# Patient Record
Sex: MALE | Race: WHITE | NOT HISPANIC OR LATINO | Employment: FULL TIME | URBAN - NONMETROPOLITAN AREA
[De-identification: names, ages, dates, MRNs, and addresses within clinical notes are randomized per-mention and may not be internally consistent; named-entity substitution may affect disease eponyms.]

---

## 2021-07-18 ENCOUNTER — OFFICE VISIT (OUTPATIENT)
Dept: URGENT CARE | Facility: PHYSICIAN GROUP | Age: 36
End: 2021-07-18
Payer: COMMERCIAL

## 2021-07-18 ENCOUNTER — HOSPITAL ENCOUNTER (OUTPATIENT)
Facility: MEDICAL CENTER | Age: 36
End: 2021-07-18
Attending: PHYSICIAN ASSISTANT
Payer: COMMERCIAL

## 2021-07-18 ENCOUNTER — APPOINTMENT (OUTPATIENT)
Dept: RADIOLOGY | Facility: IMAGING CENTER | Age: 36
End: 2021-07-18
Attending: PHYSICIAN ASSISTANT
Payer: COMMERCIAL

## 2021-07-18 VITALS
TEMPERATURE: 97.5 F | SYSTOLIC BLOOD PRESSURE: 106 MMHG | DIASTOLIC BLOOD PRESSURE: 74 MMHG | WEIGHT: 226 LBS | BODY MASS INDEX: 30.61 KG/M2 | RESPIRATION RATE: 12 BRPM | HEART RATE: 104 BPM | OXYGEN SATURATION: 93 % | HEIGHT: 72 IN

## 2021-07-18 DIAGNOSIS — R79.81 LOW OXYGEN SATURATION: ICD-10-CM

## 2021-07-18 DIAGNOSIS — R00.0 TACHYCARDIA: ICD-10-CM

## 2021-07-18 DIAGNOSIS — R06.02 SOB (SHORTNESS OF BREATH): ICD-10-CM

## 2021-07-18 PROCEDURE — U0005 INFEC AGEN DETEC AMPLI PROBE: HCPCS

## 2021-07-18 PROCEDURE — U0003 INFECTIOUS AGENT DETECTION BY NUCLEIC ACID (DNA OR RNA); SEVERE ACUTE RESPIRATORY SYNDROME CORONAVIRUS 2 (SARS-COV-2) (CORONAVIRUS DISEASE [COVID-19]), AMPLIFIED PROBE TECHNIQUE, MAKING USE OF HIGH THROUGHPUT TECHNOLOGIES AS DESCRIBED BY CMS-2020-01-R: HCPCS

## 2021-07-18 PROCEDURE — 71046 X-RAY EXAM CHEST 2 VIEWS: CPT | Mod: TC,FY | Performed by: PHYSICIAN ASSISTANT

## 2021-07-18 PROCEDURE — 99204 OFFICE O/P NEW MOD 45 MIN: CPT | Mod: 25 | Performed by: PHYSICIAN ASSISTANT

## 2021-07-18 RX ORDER — DEXAMETHASONE SODIUM PHOSPHATE 10 MG/ML
10 INJECTION INTRAMUSCULAR; INTRAVENOUS ONCE
Status: COMPLETED | OUTPATIENT
Start: 2021-07-18 | End: 2021-07-18

## 2021-07-18 RX ORDER — DEXAMETHASONE 6 MG/1
6 TABLET ORAL DAILY
Qty: 10 TABLET | Refills: 0 | Status: SHIPPED | OUTPATIENT
Start: 2021-07-18 | End: 2021-07-28

## 2021-07-18 RX ADMIN — DEXAMETHASONE SODIUM PHOSPHATE 10 MG: 10 INJECTION INTRAMUSCULAR; INTRAVENOUS at 12:48

## 2021-07-18 NOTE — PROGRESS NOTES
Chief Complaint   Patient presents with   • Fatigue     loss of appetite   • Nasal Congestion     x7 days   • Cough       HISTORY OF PRESENT ILLNESS: Patient is a 35 y.o. male who presents today for the following:    Cough and fatigue x1 week  Nasal congestion, shortness of breath, fever, chills  Patient feels very fatigued  No history of Covid no history of Covid vaccine    There are no problems to display for this patient.      Allergies:Patient has no known allergies.    Current Outpatient Medications Ordered in Epic   Medication Sig Dispense Refill   • dexamethasone (DECADRON) 6 MG Tab Take 1 tablet by mouth every day for 10 days. 10 tablet 0     No current Epic-ordered facility-administered medications on file.       History reviewed. No pertinent past medical history.    Social History     Tobacco Use   • Smoking status: Never Smoker   • Smokeless tobacco: Never Used   Substance Use Topics   • Alcohol use: Yes     Comment: rarely   • Drug use: No       No family status information on file.   History reviewed. No pertinent family history.    Review of Systems:   Constitutional ROS: No unexpected change in weight  Pulmonary ROS: No chronic cough, sputum, or hemoptysis, No dyspnea on exertion, No wheezing  Cardiovascular ROS: No diaphoresis, No edema, No palpitations  Hematologic/Lymphatic ROS: No chills, No night sweats, No weight loss  Skin/Integumentary ROS: No edema, No evidence of rash, No itching      Exam:  /74   Pulse (!) 104   Temp 36.4 °C (97.5 °F)   Resp 12   Ht 1.829 m (6')   Wt 103 kg (226 lb)   SpO2 93%   General: Well developed, well nourished. No distress.    Eye: PERRL/EOMI; conjunctivae clear, lids normal.  ENMT: Lips without lesions, MMM. Oropharynx is clear. Bilateral TMs are within normal limits.  Pulmonary: Unlabored respiratory effort. Lungs clear to auscultation, no wheezes, no rhonchi.    Cardiovascular: Regular rate and rhythm without murmur.   Neurologic: Grossly nonfocal.  No facial asymmetry noted.  Lymph: No cervical lymphadenopathy noted.  Skin: Warm, dry, good turgor. No rashes in visible areas.   Psych: Normal mood. Alert and oriented to person, place and time.    Assessment/Plan:  Per my read, chest x-ray suspicious for Covid pneumonia.  Decadron 10 mg given in clinic.  Starting Decadron 6 mg daily.  Will contact patient with Covid results.  Advise quarantine until these results are available.  Encouraged him to sign up for MyChart.  Follow up in the emergency department for worsening symptoms.  1. SOB (shortness of breath)  DX-CHEST-2 VIEWS    dexamethasone (DECADRON) injection (check route below) 10 mg    dexamethasone (DECADRON) 6 MG Tab    SARS-CoV-2, PCR (In-House): Collect NP OR nasal swab in VTM   2. Low oxygen saturation  DX-CHEST-2 VIEWS    dexamethasone (DECADRON) injection (check route below) 10 mg    dexamethasone (DECADRON) 6 MG Tab    SARS-CoV-2, PCR (In-House): Collect NP OR nasal swab in VTM   3. Tachycardia  DX-CHEST-2 VIEWS    dexamethasone (DECADRON) injection (check route below) 10 mg    dexamethasone (DECADRON) 6 MG Tab    SARS-CoV-2, PCR (In-House): Collect NP OR nasal swab in VTM     7/19/21 0900 hrs  [phone] (H) - not a working number  [phone] (M)  - left a message for the patient; called to see how he was feeling and left a call back number. COVID results are not yet available.    7/21/21 0852 hrs  [phone] (M) - left a message.  Patient's test results are just now available this morning showing positive Covid.  Will try back again later.    [phone] (mother's listed number, Sofia Arzate) - left a message

## 2021-07-18 NOTE — LETTER
July 18, 2021         Patient: Kadeem Arzate Jr   YOB: 1985   Date of Visit: 7/18/2021           To Whom it May Concern:    Kadeem Arzate Jr was seen in my clinic on 7/18/2021.     Please excuse patient for missing school/work until COVID results are available, typically taking 1-3 days.  They have been advised to quarantine during this time.      If you have any questions or concerns, please don't hesitate to call.        Sincerely,           Virginie Hamilton P.A.-C.  Electronically Signed

## 2021-07-19 DIAGNOSIS — R79.81 LOW OXYGEN SATURATION: ICD-10-CM

## 2021-07-19 DIAGNOSIS — R06.02 SOB (SHORTNESS OF BREATH): ICD-10-CM

## 2021-07-19 DIAGNOSIS — R00.0 TACHYCARDIA: ICD-10-CM

## 2021-07-20 LAB
COVID ORDER STATUS COVID19: NORMAL
SARS-COV-2 RNA RESP QL NAA+PROBE: DETECTED
SPECIMEN SOURCE: ABNORMAL

## 2021-07-21 ENCOUNTER — TELEPHONE (OUTPATIENT)
Dept: URGENT CARE | Facility: PHYSICIAN GROUP | Age: 36
End: 2021-07-21

## 2021-07-21 NOTE — TELEPHONE ENCOUNTER
----- Message from Virginie Hamilton P.A.-C. sent at 7/21/2021  9:05 AM PDT -----  Tried patient's cell (left a message) and patient's mom's phone. + COVID.

## 2023-03-29 ENCOUNTER — OFFICE VISIT (OUTPATIENT)
Dept: URGENT CARE | Facility: CLINIC | Age: 38
End: 2023-03-29

## 2023-03-29 VITALS
WEIGHT: 235 LBS | BODY MASS INDEX: 31.83 KG/M2 | HEIGHT: 72 IN | SYSTOLIC BLOOD PRESSURE: 120 MMHG | RESPIRATION RATE: 16 BRPM | TEMPERATURE: 98.3 F | OXYGEN SATURATION: 95 % | HEART RATE: 98 BPM | DIASTOLIC BLOOD PRESSURE: 86 MMHG

## 2023-03-29 DIAGNOSIS — Z02.1 PHYSICAL EXAM, PRE-EMPLOYMENT: ICD-10-CM

## 2023-03-29 DIAGNOSIS — Z02.1 PRE-EMPLOYMENT DRUG SCREENING: ICD-10-CM

## 2023-03-29 LAB
AMP AMPHETAMINE: NORMAL
BAR BARBITURATES: NORMAL
BZO BENZODIAZEPINES: NORMAL
COC COCAINE: NORMAL
INT CON NEG: NORMAL
INT CON POS: NORMAL
MDMA ECSTASY: NORMAL
MET METHAMPHETAMINES: NORMAL
MTD METHADONE: NORMAL
OPI OPIATES: NORMAL
OXY OXYCODONE: NORMAL
PCP PHENCYCLIDINE: NORMAL
POC URINE DRUG SCREEN OCDRS: NORMAL
THC: NORMAL

## 2023-03-29 PROCEDURE — 99213 OFFICE O/P EST LOW 20 MIN: CPT | Performed by: FAMILY MEDICINE

## 2023-03-29 PROCEDURE — 80305 DRUG TEST PRSMV DIR OPT OBS: CPT | Performed by: FAMILY MEDICINE

## 2023-03-29 PROCEDURE — 8915 PR COMPREHENSIVE PHYSICAL: Performed by: FAMILY MEDICINE

## 2023-03-29 NOTE — PROGRESS NOTES
Subjective:   Kadeem Arzate Jr is a 37 y.o. male who presents for Employment Physical (Px/UDS)      HPI    ROS    Medications, Allergies, and current problem list reviewed today in Epic.     Objective:     /86   Pulse 98   Temp 36.8 °C (98.3 °F) (Temporal)   Resp 16   Ht 1.829 m (6')   Wt 107 kg (235 lb)   SpO2 95%     Physical Exam see form    Assessment/Plan:     Diagnosis and associated orders:     1. Physical exam, pre-employment Active       2. Pre-employment drug screening  POCT 11 Panel Urine Drug Screen         Comments/MDM:     See forms, no restriction         Differential diagnosis, natural history, supportive care, and indications for immediate follow-up discussed.    Advised the patient to follow-up with the primary care physician for recheck, reevaluation, and consideration of further management.    Please note that this dictation was created using voice recognition software. I have made a reasonable attempt to correct obvious errors, but I expect that there are errors of grammar and possibly content that I did not discover before finalizing the note.    This note was electronically signed by Bari Cruz M.D.